# Patient Record
Sex: FEMALE | Race: WHITE
[De-identification: names, ages, dates, MRNs, and addresses within clinical notes are randomized per-mention and may not be internally consistent; named-entity substitution may affect disease eponyms.]

---

## 2017-04-22 ENCOUNTER — HOSPITAL ENCOUNTER (OUTPATIENT)
Dept: HOSPITAL 62 - OD | Age: 52
End: 2017-04-22
Attending: FAMILY MEDICINE
Payer: COMMERCIAL

## 2017-04-22 DIAGNOSIS — M54.2: ICD-10-CM

## 2017-04-22 DIAGNOSIS — G89.29: ICD-10-CM

## 2017-04-22 DIAGNOSIS — F41.9: Primary | ICD-10-CM

## 2017-04-22 DIAGNOSIS — N12: ICD-10-CM

## 2017-04-22 LAB
ALBUMIN SERPL-MCNC: 4.2 G/DL (ref 3.5–5)
ALP SERPL-CCNC: 71 U/L (ref 38–126)
ALT SERPL-CCNC: 26 U/L (ref 9–52)
ANION GAP SERPL CALC-SCNC: 13 MMOL/L (ref 5–19)
AST SERPL-CCNC: 15 U/L (ref 14–36)
BASOPHILS # BLD AUTO: 0 10^3/UL (ref 0–0.2)
BASOPHILS NFR BLD AUTO: 0.5 % (ref 0–2)
BILIRUB DIRECT SERPL-MCNC: 0.2 MG/DL (ref 0–0.4)
BILIRUB SERPL-MCNC: 0.4 MG/DL (ref 0.2–1.3)
BUN SERPL-MCNC: 9 MG/DL (ref 7–20)
CALCIUM: 9.3 MG/DL (ref 8.4–10.2)
CHLORIDE SERPL-SCNC: 102 MMOL/L (ref 98–107)
CHOLEST SERPL-MCNC: 223.86 MG/DL (ref 0–200)
CO2 SERPL-SCNC: 27 MMOL/L (ref 22–30)
CREAT SERPL-MCNC: 0.63 MG/DL (ref 0.52–1.25)
DIRECT HDL: 38 MG/DL (ref 40–?)
EOSINOPHIL # BLD AUTO: 0.1 10^3/UL (ref 0–0.6)
EOSINOPHIL NFR BLD AUTO: 1.3 % (ref 0–6)
ERYTHROCYTE [DISTWIDTH] IN BLOOD BY AUTOMATED COUNT: 14.8 % (ref 11.5–14)
GLUCOSE SERPL-MCNC: 83 MG/DL (ref 75–110)
HCT VFR BLD CALC: 38.8 % (ref 36–47)
HGB BLD-MCNC: 12.9 G/DL (ref 12–15.5)
HGB HCT DIFFERENCE: -0.1
LDLC SERPL DIRECT ASSAY-MCNC: 113 MG/DL (ref ?–100)
LYMPHOCYTES # BLD AUTO: 2.1 10^3/UL (ref 0.5–4.7)
LYMPHOCYTES NFR BLD AUTO: 21.4 % (ref 13–45)
MCH RBC QN AUTO: 29 PG (ref 27–33.4)
MCHC RBC AUTO-ENTMCNC: 33.2 G/DL (ref 32–36)
MCV RBC AUTO: 87 FL (ref 80–97)
MONOCYTES # BLD AUTO: 0.8 10^3/UL (ref 0.1–1.4)
MONOCYTES NFR BLD AUTO: 7.9 % (ref 3–13)
NEUTROPHILS # BLD AUTO: 6.6 10^3/UL (ref 1.7–8.2)
NEUTS SEG NFR BLD AUTO: 68.9 % (ref 42–78)
POTASSIUM SERPL-SCNC: 3.8 MMOL/L (ref 3.6–5)
PROT SERPL-MCNC: 6.4 G/DL (ref 6.3–8.2)
RBC # BLD AUTO: 4.44 10^6/UL (ref 3.72–5.28)
SODIUM SERPL-SCNC: 142.1 MMOL/L (ref 137–145)
TRIGL SERPL-MCNC: 257 MG/DL (ref ?–150)
VLDLC SERPL CALC-MCNC: 51.4 MG/DL (ref 10–31)
WBC # BLD AUTO: 9.6 10^3/UL (ref 4–10.5)

## 2017-04-22 PROCEDURE — 85025 COMPLETE CBC W/AUTO DIFF WBC: CPT

## 2017-04-22 PROCEDURE — 84443 ASSAY THYROID STIM HORMONE: CPT

## 2017-04-22 PROCEDURE — 83880 ASSAY OF NATRIURETIC PEPTIDE: CPT

## 2017-04-22 PROCEDURE — 83036 HEMOGLOBIN GLYCOSYLATED A1C: CPT

## 2017-04-22 PROCEDURE — 82043 UR ALBUMIN QUANTITATIVE: CPT

## 2017-04-22 PROCEDURE — 36415 COLL VENOUS BLD VENIPUNCTURE: CPT

## 2017-04-22 PROCEDURE — 82570 ASSAY OF URINE CREATININE: CPT

## 2017-04-22 PROCEDURE — 80061 LIPID PANEL: CPT

## 2017-04-22 PROCEDURE — 80053 COMPREHEN METABOLIC PANEL: CPT

## 2017-04-24 LAB
CREAT UR-MCNC: 116.6 MG/DL
MICROALBUMIN UR-MCNC: 7 UG/ML

## 2017-06-27 ENCOUNTER — HOSPITAL ENCOUNTER (OUTPATIENT)
Dept: HOSPITAL 62 - LAB | Age: 52
End: 2017-06-27
Attending: FAMILY MEDICINE
Payer: SELF-PAY

## 2017-06-27 DIAGNOSIS — M25.50: Primary | ICD-10-CM

## 2017-06-27 DIAGNOSIS — R07.89: ICD-10-CM

## 2017-06-27 DIAGNOSIS — R06.02: ICD-10-CM

## 2017-06-27 LAB
ANION GAP SERPL CALC-SCNC: 9 MMOL/L (ref 5–19)
BUN SERPL-MCNC: 6 MG/DL (ref 7–20)
CALCIUM: 9.2 MG/DL (ref 8.4–10.2)
CHLORIDE SERPL-SCNC: 104 MMOL/L (ref 98–107)
CO2 SERPL-SCNC: 27 MMOL/L (ref 22–30)
CREAT SERPL-MCNC: 0.63 MG/DL (ref 0.52–1.25)
GLUCOSE SERPL-MCNC: 97 MG/DL (ref 75–110)
POTASSIUM SERPL-SCNC: 4.5 MMOL/L (ref 3.6–5)
SODIUM SERPL-SCNC: 140.1 MMOL/L (ref 137–145)

## 2017-06-27 PROCEDURE — 86038 ANTINUCLEAR ANTIBODIES: CPT

## 2017-06-27 PROCEDURE — 83880 ASSAY OF NATRIURETIC PEPTIDE: CPT

## 2017-06-27 PROCEDURE — 36415 COLL VENOUS BLD VENIPUNCTURE: CPT

## 2017-06-27 PROCEDURE — 80048 BASIC METABOLIC PNL TOTAL CA: CPT

## 2017-06-27 PROCEDURE — 86430 RHEUMATOID FACTOR TEST QUAL: CPT

## 2017-06-27 PROCEDURE — 86812 HLA TYPING A B OR C: CPT

## 2017-06-27 PROCEDURE — 85652 RBC SED RATE AUTOMATED: CPT

## 2018-01-25 ENCOUNTER — HOSPITAL ENCOUNTER (OUTPATIENT)
Dept: HOSPITAL 62 - RAD | Age: 53
End: 2018-01-25
Attending: FAMILY MEDICINE
Payer: COMMERCIAL

## 2018-01-25 DIAGNOSIS — M25.561: Primary | ICD-10-CM

## 2018-01-25 DIAGNOSIS — G89.29: ICD-10-CM

## 2018-01-25 PROCEDURE — 72050 X-RAY EXAM NECK SPINE 4/5VWS: CPT

## 2018-01-25 NOTE — RADIOLOGY REPORT (SQ)
EXAM DESCRIPTION:  CERV SP 4 OR 5 VIEWS



COMPLETED DATE/TIME:  1/25/2018 11:31 am



REASON FOR STUDY:  OTHER CHRONIC PAIN M25.561  PAIN IN RIGHT KNEE G89.29  OTHER CHRONIC PAIN



COMPARISON:  None.



NUMBER OF VIEWS:  Five views.



TECHNIQUE:  AP, lateral, obliques and odontoid radiographic images acquired of the cervical spine.



LIMITATIONS:  None.



FINDINGS:  MINERALIZATION: Normal.

ALIGNMENT: There is some mild loss of the normal cervical lordosis.

VERTEBRAE: Vertebral bodies of normal height.

DISCS: There is decrease in the C5-C6 and C6-C7 disc space heights with associated osteophytic lippin
g.

FORAMINA: No osteophytes or foraminal narrowing.

LATERAL AND POSTERIOR ELEMENTS: Facets, lateral masses and spinous processes without significant find
ings.

HARDWARE: None in the spine.

SOFT TISSUES: No masses or calcifications. Lung apices clear.

OTHER: No other significant finding.



IMPRESSION:  Degenerative changes as noted above



TECHNICAL DOCUMENTATION:  JOB ID:  [id number]

 2011 [employer]- All Rights Reserved

## 2018-01-25 NOTE — RADIOLOGY REPORT (SQ)
EXAM DESCRIPTION:  KNEE BILATERAL 1-2 VIEWS



COMPLETED DATE/TIME:  1/25/2018 11:32 am



REASON FOR STUDY:  CHRONIC PAIN BOTH KNEES M25.561  PAIN IN RIGHT KNEE G89.29  OTHER CHRONIC PAIN



COMPARISON:  None.



NUMBER OF VIEWS:  Two views.



TECHNIQUE:  AP and lateral standing bilateral knees.



LIMITATIONS:  None.



FINDINGS:  MINERALIZATION: Normal.

RIGHT KNEE

BONES: No acute fracture. No worrisome bone lesions.

MEDIAL COMPARTMENT: No significant osteophytes.   No joint space narrowing.   No chondrocalcinosis.

LATERAL COMPARTMENT: No significant osteophytes.   No joint space narrowing.   No chondrocalcinosis.

LEFT KNEE

BONES: No acute fracture. No worrisome bone lesions.

MEDIAL COMPARTMENT: No significant osteophytes.   No joint space narrowing.   No chondrocalcinosis.

LATERAL COMPARTMENT: No significant osteophytes.   No joint space narrowing.   No chondrocalcinosis.



IMPRESSION:  NEGATIVE STUDY OF THE STANDING LEFT AND RIGHT KNEES. NO SIGNIFICANT JOINT SPACE NARROWIN
G OR OTHER SIGNS OF ARTHRITIS.



TECHNICAL DOCUMENTATION:  JOB ID:  [id number]

 2011 [employer]- All Rights Reserved

## 2018-01-31 ENCOUNTER — HOSPITAL ENCOUNTER (OUTPATIENT)
Dept: HOSPITAL 62 - LAB | Age: 53
End: 2018-01-31
Attending: FAMILY MEDICINE
Payer: SELF-PAY

## 2018-01-31 DIAGNOSIS — R06.02: ICD-10-CM

## 2018-01-31 DIAGNOSIS — M25.50: ICD-10-CM

## 2018-01-31 DIAGNOSIS — R07.89: ICD-10-CM

## 2018-01-31 DIAGNOSIS — N12: Primary | ICD-10-CM

## 2018-01-31 DIAGNOSIS — R11.0: ICD-10-CM

## 2018-01-31 LAB
ADD MANUAL DIFF: NO
ANION GAP SERPL CALC-SCNC: 6 MMOL/L (ref 5–19)
BASOPHILS # BLD AUTO: 0.1 10^3/UL (ref 0–0.2)
BASOPHILS NFR BLD AUTO: 0.6 % (ref 0–2)
BUN SERPL-MCNC: 7 MG/DL (ref 7–20)
CALCIUM: 9.8 MG/DL (ref 8.4–10.2)
CHLORIDE SERPL-SCNC: 101 MMOL/L (ref 98–107)
CO2 SERPL-SCNC: 32 MMOL/L (ref 22–30)
EOSINOPHIL # BLD AUTO: 0.2 10^3/UL (ref 0–0.6)
EOSINOPHIL NFR BLD AUTO: 1.7 % (ref 0–6)
ERYTHROCYTE [DISTWIDTH] IN BLOOD BY AUTOMATED COUNT: 14.1 % (ref 11.5–14)
GLUCOSE SERPL-MCNC: 90 MG/DL (ref 75–110)
HCT VFR BLD CALC: 37 % (ref 36–47)
HGB BLD-MCNC: 12.5 G/DL (ref 12–15.5)
LYMPHOCYTES # BLD AUTO: 2.6 10^3/UL (ref 0.5–4.7)
LYMPHOCYTES NFR BLD AUTO: 27.7 % (ref 13–45)
MCH RBC QN AUTO: 30.1 PG (ref 27–33.4)
MCHC RBC AUTO-ENTMCNC: 33.8 G/DL (ref 32–36)
MCV RBC AUTO: 89 FL (ref 80–97)
MONOCYTES # BLD AUTO: 0.6 10^3/UL (ref 0.1–1.4)
MONOCYTES NFR BLD AUTO: 6.6 % (ref 3–13)
NEUTROPHILS # BLD AUTO: 6 10^3/UL (ref 1.7–8.2)
NEUTS SEG NFR BLD AUTO: 63.4 % (ref 42–78)
PLATELET # BLD: 246 10^3/UL (ref 150–450)
POTASSIUM SERPL-SCNC: 4.6 MMOL/L (ref 3.6–5)
RBC # BLD AUTO: 4.15 10^6/UL (ref 3.72–5.28)
SODIUM SERPL-SCNC: 138.7 MMOL/L (ref 137–145)
TOTAL CELLS COUNTED % (AUTO): 100 %
WBC # BLD AUTO: 9.5 10^3/UL (ref 4–10.5)

## 2018-01-31 PROCEDURE — 80048 BASIC METABOLIC PNL TOTAL CA: CPT

## 2018-01-31 PROCEDURE — 81001 URINALYSIS AUTO W/SCOPE: CPT

## 2018-01-31 PROCEDURE — 86038 ANTINUCLEAR ANTIBODIES: CPT

## 2018-01-31 PROCEDURE — 87086 URINE CULTURE/COLONY COUNT: CPT

## 2018-01-31 PROCEDURE — 85652 RBC SED RATE AUTOMATED: CPT

## 2018-01-31 PROCEDURE — 86812 HLA TYPING A B OR C: CPT

## 2018-01-31 PROCEDURE — 36415 COLL VENOUS BLD VENIPUNCTURE: CPT

## 2018-01-31 PROCEDURE — 86430 RHEUMATOID FACTOR TEST QUAL: CPT

## 2018-01-31 PROCEDURE — 86140 C-REACTIVE PROTEIN: CPT

## 2018-01-31 PROCEDURE — 83880 ASSAY OF NATRIURETIC PEPTIDE: CPT

## 2018-01-31 PROCEDURE — 85025 COMPLETE CBC W/AUTO DIFF WBC: CPT

## 2018-02-02 LAB
APPEARANCE UR: CLEAR
APTT PPP: (no result) S
BILIRUB UR QL STRIP: NEGATIVE
GLUCOSE UR STRIP-MCNC: NEGATIVE MG/DL
KETONES UR STRIP-MCNC: NEGATIVE MG/DL
NITRITE UR QL STRIP: NEGATIVE
PH UR STRIP: 6 [PH] (ref 5–9)
PROT UR STRIP-MCNC: NEGATIVE MG/DL
SP GR UR STRIP: 1
UROBILINOGEN UR-MCNC: NEGATIVE MG/DL (ref ?–2)